# Patient Record
Sex: FEMALE | Race: WHITE | NOT HISPANIC OR LATINO | Employment: FULL TIME | URBAN - METROPOLITAN AREA
[De-identification: names, ages, dates, MRNs, and addresses within clinical notes are randomized per-mention and may not be internally consistent; named-entity substitution may affect disease eponyms.]

---

## 2022-04-25 ENCOUNTER — NON-PROVIDER VISIT (OUTPATIENT)
Dept: OCCUPATIONAL MEDICINE | Facility: CLINIC | Age: 52
End: 2022-04-25
Payer: COMMERCIAL

## 2022-04-25 ENCOUNTER — EH NON-PROVIDER (OUTPATIENT)
Dept: OCCUPATIONAL MEDICINE | Facility: CLINIC | Age: 52
End: 2022-04-25
Payer: COMMERCIAL

## 2022-04-25 DIAGNOSIS — Z02.89 ENCOUNTER FOR OCCUPATIONAL HEALTH EXAMINATION INVOLVING RESPIRATOR: ICD-10-CM

## 2022-04-25 PROCEDURE — 94375 RESPIRATORY FLOW VOLUME LOOP: CPT | Performed by: PREVENTIVE MEDICINE

## 2022-05-16 ENCOUNTER — HOSPITAL ENCOUNTER (OUTPATIENT)
Facility: MEDICAL CENTER | Age: 52
End: 2022-05-16
Attending: NURSE PRACTITIONER
Payer: COMMERCIAL

## 2022-05-16 ENCOUNTER — OFFICE VISIT (OUTPATIENT)
Dept: URGENT CARE | Facility: CLINIC | Age: 52
End: 2022-05-16
Payer: COMMERCIAL

## 2022-05-16 VITALS
TEMPERATURE: 98 F | BODY MASS INDEX: 25.9 KG/M2 | OXYGEN SATURATION: 99 % | RESPIRATION RATE: 18 BRPM | HEIGHT: 67 IN | WEIGHT: 165 LBS | SYSTOLIC BLOOD PRESSURE: 120 MMHG | HEART RATE: 69 BPM | DIASTOLIC BLOOD PRESSURE: 84 MMHG

## 2022-05-16 DIAGNOSIS — J22 LRTI (LOWER RESPIRATORY TRACT INFECTION): ICD-10-CM

## 2022-05-16 DIAGNOSIS — Z20.822 CLOSE EXPOSURE TO COVID-19 VIRUS: ICD-10-CM

## 2022-05-16 DIAGNOSIS — R05.9 COUGH: ICD-10-CM

## 2022-05-16 LAB
COVID ORDER STATUS COVID19: NORMAL
SARS-COV-2 RNA RESP QL NAA+PROBE: NOTDETECTED
SPECIMEN SOURCE: NORMAL

## 2022-05-16 PROCEDURE — U0003 INFECTIOUS AGENT DETECTION BY NUCLEIC ACID (DNA OR RNA); SEVERE ACUTE RESPIRATORY SYNDROME CORONAVIRUS 2 (SARS-COV-2) (CORONAVIRUS DISEASE [COVID-19]), AMPLIFIED PROBE TECHNIQUE, MAKING USE OF HIGH THROUGHPUT TECHNOLOGIES AS DESCRIBED BY CMS-2020-01-R: HCPCS

## 2022-05-16 PROCEDURE — 99203 OFFICE O/P NEW LOW 30 MIN: CPT | Performed by: NURSE PRACTITIONER

## 2022-05-16 PROCEDURE — U0005 INFEC AGEN DETEC AMPLI PROBE: HCPCS

## 2022-05-16 RX ORDER — DOXYCYCLINE HYCLATE 100 MG
100 TABLET ORAL 2 TIMES DAILY
Qty: 14 TABLET | Refills: 0 | Status: SHIPPED | OUTPATIENT
Start: 2022-05-16 | End: 2022-05-23

## 2022-05-16 RX ORDER — ROSUVASTATIN CALCIUM 20 MG/1
TABLET, COATED ORAL
COMMUNITY
Start: 2022-04-03

## 2022-05-16 RX ORDER — CLONAZEPAM 2 MG/1
TABLET ORAL
COMMUNITY
Start: 2022-04-04

## 2022-05-16 RX ORDER — LEVOTHYROXINE SODIUM 0.12 MG/1
125 TABLET ORAL
COMMUNITY
Start: 2022-03-29

## 2022-05-16 RX ORDER — METHOCARBAMOL 750 MG/1
750 TABLET, FILM COATED ORAL 3 TIMES DAILY
COMMUNITY
Start: 2022-04-07

## 2022-05-16 RX ORDER — TOFACITINIB 11 MG/1
TABLET, FILM COATED, EXTENDED RELEASE ORAL
COMMUNITY

## 2022-05-16 RX ORDER — BENZONATATE 100 MG/1
100 CAPSULE ORAL 3 TIMES DAILY PRN
Qty: 60 CAPSULE | Refills: 0 | Status: SHIPPED | OUTPATIENT
Start: 2022-05-16

## 2022-05-16 RX ORDER — METHYLPREDNISOLONE 4 MG/1
TABLET ORAL
Qty: 21 TABLET | Refills: 0 | Status: SHIPPED | OUTPATIENT
Start: 2022-05-16

## 2022-05-16 RX ORDER — VENLAFAXINE HYDROCHLORIDE 150 MG/1
150 CAPSULE, EXTENDED RELEASE ORAL DAILY
COMMUNITY

## 2022-05-16 RX ORDER — ALBUTEROL SULFATE 90 UG/1
2 AEROSOL, METERED RESPIRATORY (INHALATION) EVERY 6 HOURS PRN
Qty: 8.5 G | Refills: 0 | Status: SHIPPED | OUTPATIENT
Start: 2022-05-16

## 2022-05-16 RX ORDER — CETIRIZINE HYDROCHLORIDE 10 MG/1
TABLET ORAL
COMMUNITY

## 2022-05-16 RX ORDER — CARVEDILOL 3.12 MG/1
TABLET ORAL
COMMUNITY

## 2022-05-16 ASSESSMENT — ENCOUNTER SYMPTOMS
CHILLS: 1
RHINORRHEA: 1
FEVER: 1
MYALGIAS: 0
NAUSEA: 0
SORE THROAT: 0
HEADACHES: 1
EYE PAIN: 0
SHORTNESS OF BREATH: 1
COUGH: 1
DIZZINESS: 0
VOMITING: 0

## 2022-05-16 NOTE — LETTER
May 16, 2022         Patient: Fidelia Velazquez   YOB: 1970   Date of Visit: 5/16/2022           To Whom it May Concern:    Fidelia Velazquez was seen in my clinic on 5/16/2022. She {Return to school/sport/work:96184}    If you have any questions or concerns, please don't hesitate to call.        Sincerely,           BAMBI Cruz.  Electronically Signed

## 2022-05-16 NOTE — PROGRESS NOTES
"Subjective:   Fidelia Velazquez is a 52 y.o. female who presents for Cough (Fever, did have known covid contact/Symptoms started Thursday/)      URI   This is a new problem. The current episode started more than 1 month ago (worse last 4 days was exposed to covid). The problem has been gradually worsening. Associated symptoms include congestion, coughing, headaches and rhinorrhea. Pertinent negatives include no chest pain, ear pain, nausea, plugged ear sensation, rash, sore throat or vomiting. She has tried acetaminophen for the symptoms. The treatment provided no relief.       Review of Systems   Constitutional: Positive for chills, fever and malaise/fatigue.   HENT: Positive for congestion and rhinorrhea. Negative for ear pain and sore throat.    Eyes: Negative for pain.   Respiratory: Positive for cough and shortness of breath.    Cardiovascular: Negative for chest pain.   Gastrointestinal: Negative for nausea and vomiting.   Genitourinary: Negative for hematuria.   Musculoskeletal: Negative for myalgias.   Skin: Negative for rash.   Neurological: Positive for headaches. Negative for dizziness.       Medications:    • albuterol Aers  • benzonatate Caps  • carvedilol Tabs  • cetirizine Tabs  • clonazepam  • doxycycline Tabs  • levothyroxine Tabs  • methocarbamol Tabs  • methylPREDNISolone Tbpk  • rosuvastatin Tabs  • venlafaxine  • Xeljanz XR Tb24    Allergies: Meperidine hcl and Morphine    Problem List: Fidelia Velazquez does not have a problem list on file.    Surgical History:  No past surgical history on file.    Past Social Hx: Fidelia Velazquez  reports that she has never smoked. She has never used smokeless tobacco.     Past Family Hx:  Fidelia Velazquez family history is not on file.     Problem list, medications, and allergies reviewed by myself today in Epic.     Objective:     /84   Pulse 69   Temp 36.7 °C (98 °F) (Temporal)   Resp 18   Ht 1.702 m (5' 7\")   Wt 74.8 kg (165 lb)   SpO2 99%   BMI 25.84 " kg/m²     Physical Exam  Vitals and nursing note reviewed.   Constitutional:       General: She is not in acute distress.     Appearance: She is well-developed.   HENT:      Head: Normocephalic and atraumatic.      Right Ear: Tympanic membrane and external ear normal.      Left Ear: Tympanic membrane and external ear normal.      Nose: Nose normal.      Right Sinus: No maxillary sinus tenderness or frontal sinus tenderness.      Left Sinus: No maxillary sinus tenderness or frontal sinus tenderness.      Mouth/Throat:      Mouth: Mucous membranes are moist.      Pharynx: Uvula midline. No posterior oropharyngeal erythema.      Tonsils: No tonsillar exudate or tonsillar abscesses.   Eyes:      General:         Right eye: No discharge.         Left eye: No discharge.      Conjunctiva/sclera: Conjunctivae normal.   Cardiovascular:      Rate and Rhythm: Normal rate.   Pulmonary:      Effort: Pulmonary effort is normal. No respiratory distress.      Breath sounds: Normal breath sounds.   Abdominal:      General: There is no distension.   Musculoskeletal:         General: Normal range of motion.   Skin:     General: Skin is warm and dry.   Neurological:      General: No focal deficit present.      Mental Status: She is alert and oriented to person, place, and time. Mental status is at baseline.      Gait: Gait (gait at baseline) normal.   Psychiatric:         Judgment: Judgment normal.         Assessment/Plan:     Diagnosis and associated orders:     1. LRTI (lower respiratory tract infection)  SARS-CoV-2 PCR (24 hour In-House): Collect NP swab in VTM    benzonatate (TESSALON) 100 MG Cap    doxycycline (VIBRAMYCIN) 100 MG Tab    methylPREDNISolone (MEDROL DOSEPAK) 4 MG Tablet Therapy Pack    albuterol 108 (90 Base) MCG/ACT Aero Soln inhalation aerosol   2. Cough  SARS-CoV-2 PCR (24 hour In-House): Collect NP swab in VTM    benzonatate (TESSALON) 100 MG Cap    doxycycline (VIBRAMYCIN) 100 MG Tab    methylPREDNISolone  (MEDROL DOSEPAK) 4 MG Tablet Therapy Pack   3. Close exposure to COVID-19 virus          Comments/MDM:     Advised to continue supportive care with Tylenol and/or ibuprofen for fevers and discomfort. Increased fluids and electrolytes.  Test for COVID-19 via PCR. Result will be reviewed by myself. We will call/message back for results and appropriate further instructions. Instructed to sign up for [employer] if they have not already. Result will be automatically released to [employer] application for patient review. I will be sending a message with Next Step Instructions to [employer] soon after resulted.   Symptomatic and supportive care:   Plenty of oral hydration and rest   Over the counter cough suppressant as directed.  Tylenol or ibuprofen for pain and fever as directed.   Warm salt water gargles for sore throat, soft foods, cool liquids.   Saline nasal spray and Flonase as a decongestant.   Infection control measures at home. Stay away from people, Hand washing, covering sneeze/cough, disinfect surfaces.   Remain home from work, school, and other populated environments. Work note provided with information of quarantine measures per CDC guidelines.   Overall, the patient is well-appearing. They are not hypoxic, afebrile, and a normal pulmonary exam.      •   •              Please note that this dictation was created using voice recognition software. I have made a reasonable attempt to correct obvious errors, but I expect that there are errors of grammar and possibly content that I did not discover before finalizing the note.    This note was electronically signed by Ryan CLIFFORD.

## 2022-05-16 NOTE — LETTER
May 16, 2022         Patient: Fidelia Velazquez   YOB: 1970   Date of Visit: 5/16/2022           To Whom it May Concern:     Your employee was seen in our clinic today.  A concern for COVID-19 has been identified and testing is in progress.     We are asking you to excuse absences while following self-isolation protocol per Center for Disease Control (CDC) guidelines.  Your employee will be able to access test results through our electronic delivery system called [employer].     If the results of testing are negative, and once there has been no fever (temperature >100.4 F) for at least 72 hours without treatment, and no vomiting or diarrhea for at least 48 hours, then return to work is approved.    If the results of testing are POSITIVE, your employee should follow the CDC guidelines.  These are isolations for a minimum of 5 days and at least 24 hours have passed since your last fever without the use of a fever-reducing medication and all other systems have improved.  In addition, it is recommended you wear a mask for an additional 5 days to the health department may contact you and provide further directions regarding self-isolation and return to work.    In general, repeat testing is not necessary and not offered through our [facility].     This is the only note that will be provided from [facility] for this visit.  Your employee will require an appointment with a primary care provider if FMLA or disability forms are required.         If you have any questions please do not hesitate to call me at the phone number listed below.    Sincerely,      Ryan Vilchis APRBAILEE  [phone]

## 2022-10-07 ENCOUNTER — IMMUNIZATION (OUTPATIENT)
Dept: OCCUPATIONAL MEDICINE | Facility: CLINIC | Age: 52
End: 2022-10-07

## 2022-10-07 DIAGNOSIS — Z23 NEED FOR VACCINATION: Primary | ICD-10-CM

## 2022-10-07 PROCEDURE — 90686 IIV4 VACC NO PRSV 0.5 ML IM: CPT | Performed by: NURSE PRACTITIONER
